# Patient Record
Sex: FEMALE | Race: WHITE
[De-identification: names, ages, dates, MRNs, and addresses within clinical notes are randomized per-mention and may not be internally consistent; named-entity substitution may affect disease eponyms.]

---

## 2017-11-03 ENCOUNTER — HOSPITAL ENCOUNTER (OUTPATIENT)
Dept: HOSPITAL 80 - BMCIMAGING | Age: 77
End: 2017-11-03
Attending: INTERNAL MEDICINE
Payer: COMMERCIAL

## 2017-11-03 DIAGNOSIS — Z12.31: Primary | ICD-10-CM

## 2017-11-03 PROCEDURE — G0202 SCR MAMMO BI INCL CAD: HCPCS

## 2018-07-11 ENCOUNTER — HOSPITAL ENCOUNTER (OUTPATIENT)
Dept: HOSPITAL 80 - BMCIMAGING | Age: 78
End: 2018-07-11
Attending: FAMILY MEDICINE
Payer: COMMERCIAL

## 2018-07-11 DIAGNOSIS — M79.89: Primary | ICD-10-CM

## 2018-12-03 ENCOUNTER — HOSPITAL ENCOUNTER (OUTPATIENT)
Dept: HOSPITAL 80 - BMCIMAGING | Age: 78
End: 2018-12-03
Attending: INTERNAL MEDICINE
Payer: COMMERCIAL

## 2018-12-03 DIAGNOSIS — Z12.31: Primary | ICD-10-CM

## 2018-12-03 DIAGNOSIS — Z80.3: ICD-10-CM

## 2018-12-29 ENCOUNTER — HOSPITAL ENCOUNTER (INPATIENT)
Dept: HOSPITAL 80 - FED | Age: 78
LOS: 4 days | Discharge: NURSING, REHAB OR LONG-TERM CARE | DRG: 535 | End: 2019-01-02
Attending: FAMILY MEDICINE | Admitting: FAMILY MEDICINE
Payer: COMMERCIAL
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
## 2018-12-29 DIAGNOSIS — M54.9: ICD-10-CM

## 2018-12-29 DIAGNOSIS — D63.8: ICD-10-CM

## 2018-12-29 DIAGNOSIS — J96.01: ICD-10-CM

## 2018-12-29 DIAGNOSIS — E55.9: ICD-10-CM

## 2018-12-29 DIAGNOSIS — I48.91: ICD-10-CM

## 2018-12-29 DIAGNOSIS — S32.501A: Primary | ICD-10-CM

## 2018-12-29 DIAGNOSIS — R74.0: ICD-10-CM

## 2018-12-29 DIAGNOSIS — I50.22: ICD-10-CM

## 2018-12-29 DIAGNOSIS — Z66: ICD-10-CM

## 2018-12-29 DIAGNOSIS — K76.0: ICD-10-CM
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
## 2018-12-29 LAB — PLATELET # BLD: 89 10^3/UL (ref 150–400)

## 2018-12-29 PROCEDURE — G0480 DRUG TEST DEF 1-7 CLASSES: HCPCS

## 2018-12-29 NOTE — EDPHY
H & P


Time Seen by Provider: 12/29/18 22:11


HPI/ROS: 





Chief Complaint:  Fall, right hip pain





HPI:  78-year-old woman who reports no medical problems had a mechanical fall 

this evening landing on her right hip.  Patient states she was going from her 

bedroom to the bathroom.  Patient does admit to drinking gin and tonics this 

evening.  EMS noted that she had an irregular rhythm.  She reports no history 

of cardiac arrhythmia is in the past.  No chest pain or shortness of breath.  

She did not hit her head.  No loss of consciousness.  She is awake and alert 

and able to tell me what happened.  No fevers or chills.  No cough.  Family was 

able to help to a feet get her the bathroom with assistance but is continuing 

to complain of right-sided hip pain.  She was able to weight bear.





ROS:  10 systems were reviewed and were negative except those elements noted in 

the HPI.





PMH:  Denies





Social History: No smoking, occasional alcohol





Family History: non-contributory





Physical Exam:


Gen: Awake, Alert, No Distress


HEENT:  


     Nose: no rhinorrhea


     Eyes: PERRLA, EOMI


     Mouth: Moist mucosa 


Neck: Supple, no JVD


Chest: nontender, lungs clear to auscultation


Heart: S1, S2 normal, no murmur, tachycardic, irregularly irregular


Abd: Soft, non-tender, no guarding


Back: no CVA tenderness, no midline tenderness 


Ext: no edema, non-tender, there is a large hematoma over her right lateral hip 

with overlying ecchymosis.  No bony tenderness.


Skin: no rash


Neuro: CN II-XII intact, Sensation grossly intact, Strength 5/5 in bilateral 

upper and lower extremities





Constitutional: 


 Initial Vital Signs











Temperature (C)  36.7 C   12/29/18 22:05


 


Heart Rate  104 H  12/29/18 22:05


 


Respiratory Rate  18   12/29/18 22:05


 


Blood Pressure  93/70 L  12/29/18 22:05


 


O2 Sat (%)  95   12/29/18 22:05








 











O2 Delivery Mode               Nasal Cannula


 


O2 (L/minute)                  2














Allergies/Adverse Reactions: 


 





erythromycin base [Erythromycin Base] Allergy (Intermediate, Verified 12/11/12 

11:12)


 SCALP ITCHING AND FELT LIKE LITTLE BUMPS WERE ALL OVER IT








Home Medications: 














 Medication  Instructions  Recorded


 


[place]    12/08/12


 


oxyCODONE/APAP 5/325 [Percocet 1 tab PO Q6 #10 tab 12/08/12 5/325]  














Medical Decision Making





- Diagnostics


Imaging Results: 


 Imaging Impressions





Hip X-Ray  12/29/18 22:13


Impression: Negative. No displaced fracture.


 


Comment: If patient is unable to bear weight, consider proceeding with 

noncontrast CT of the pelvis.











ED Course/Re-evaluation: 





78-year-old with right hip contusion status post fall.  She has a new onset 

AFib with RVR.  She has been drinking tonight.  Do not have time of onset.  No 

fracture inhibitor large contusion.  Plan will be to admit for further 

evaluation of her atrial fibrillation.  I have called the hospitalist to 

arrange for admission.  Will give 5 mg of metoprolol IV.  Will hold on 

anticoagulation at the hospitalist's request.





- Data Points


Laboratory Results: 


 Laboratory Results





 12/29/18 22:39 





 12/29/18 22:39 





 











  12/29/18 12/29/18 12/29/18





  22:43 22:39 22:39


 


WBC      5.30 10^3/uL 10^3/uL





     (3.80-9.50) 


 


RBC      2.64 10^6/uL L 10^6/uL





     (4.18-5.33) 


 


Hgb      11.3 g/dL L g/dL





     (12.6-16.3) 


 


Hct      32.7 % L %





     (38.0-47.0) 


 


MCV      123.9 fL H fL





     (81.5-99.8) 


 


MCH      42.8 pg H pg





     (27.9-34.1) 


 


MCHC      34.6 g/dL g/dL





     (32.4-36.7) 


 


RDW      18.0 % H %





     (11.5-15.2) 


 


Plt Count      89 10^3/uL L 10^3/uL





     (150-400) 


 


MPV      10.4 fL fL





     (8.7-11.7) 


 


Neut % (Auto)      74.2 % %





     (39.3-74.2) 


 


Lymph % (Auto)      13.4 % L %





     (15.0-45.0) 


 


Mono % (Auto)      7.9 % %





     (4.5-13.0) 


 


Eos % (Auto)      2.3 % %





     (0.6-7.6) 


 


Baso % (Auto)      1.3 % %





     (0.3-1.7) 


 


Nucleat RBC Rel Count      0.4 % H %





     (0.0-0.2) 


 


Absolute Neuts (auto)      3.93 10^3/uL 10^3/uL





     (1.70-6.50) 


 


Absolute Lymphs (auto)      0.71 10^3/uL L 10^3/uL





     (1.00-3.00) 


 


Absolute Monos (auto)      0.42 10^3/uL 10^3/uL





     (0.30-0.80) 


 


Absolute Eos (auto)      0.12 10^3/uL 10^3/uL





     (0.03-0.40) 


 


Absolute Basos (auto)      0.07 10^3/uL 10^3/uL





     (0.02-0.10) 


 


Absolute Nucleated RBC      0.02 10^3/uL H 10^3/uL





     (0-0.01) 


 


Immature Gran %      0.9 % %





     (0.0-1.1) 


 


Immature Gran #      0.05 10^3/uL 10^3/uL





     (0.00-0.10) 


 


Platelet Estimate      Pending 





    


 


Smear Review By      Pending 





    


 


Sodium    137 mEq/L mEq/L  





    (135-145)  


 


Potassium    3.6 mEq/L mEq/L  





    (3.5-5.2)  


 


Chloride    105 mEq/L mEq/L  





    ()  


 


Carbon Dioxide    20 mEq/l L mEq/l  





    (22-31)  


 


Anion Gap    12 mEq/L mEq/L  





    (6-14)  


 


BUN    14 mg/dL mg/dL  





    (7-23)  


 


Creatinine    1.1 mg/dL H mg/dL  





    (0.6-1.0)  


 


Estimated GFR    48   





    


 


Glucose    105 mg/dL H mg/dL  





    ()  


 


Calcium    7.8 mg/dL L mg/dL  





    (8.5-10.4)  


 


POC Troponin I  0.01 ng/mL ng/mL    





   (0.00-0.08)   


 


Ethyl Alcohol    291 mg/dL H mg/dL  





    (0-10)  











Point of Care Test Results: 


 Chemistry











  12/29/18





  22:43


 


POC Troponin I  0.01 ng/mL ng/mL





   (0.00-0.08) 














Departure





- Departure


Disposition: [facility]


Clinical Impression: 


 Atrial fibrillation, Hematoma of hip





Condition: Fair


Referrals: 


Patient,NotPresent [Unknown] - As per Instructions

## 2018-12-30 LAB
CK SERPL-CCNC: 77 IU/L (ref 0–156)
INR PPP: 1.27 (ref 0.83–1.16)
PLATELET # BLD: 86 10^3/UL (ref 150–400)
PROTHROMBIN TIME: 16.1 SEC (ref 12–15)

## 2018-12-30 RX ADMIN — OXYCODONE HYDROCHLORIDE PRN MG: 15 TABLET ORAL at 02:01

## 2018-12-30 RX ADMIN — OXYCODONE HYDROCHLORIDE PRN MG: 15 TABLET ORAL at 19:45

## 2018-12-30 RX ADMIN — METOPROLOL TARTRATE SCH MG: 25 TABLET, FILM COATED ORAL at 09:20

## 2018-12-30 RX ADMIN — SODIUM CHLORIDE SCH MLS: 900 INJECTION, SOLUTION INTRAVENOUS at 19:46

## 2018-12-30 RX ADMIN — DILTIAZEM HCL 125 MG/125ML IN DEXTROSE 5% IV SOLN (1 MG/ML) SCH MLS: 125-5/125 SOLUTION at 12:00

## 2018-12-30 RX ADMIN — METOPROLOL TARTRATE SCH MG: 25 TABLET, FILM COATED ORAL at 02:01

## 2018-12-30 NOTE — ASMTCMCOM
CM Note

 

CM Note                       

Notes:

Pt is a 77 yo F [place] Pt presents with a-fib 

and hip hematoma.  Pt has a history of falls and alcohol use. Pt and daughter are interested in 

getting a life alert. Pt needs  ETOH education prior to discharge. 





Plan: TBD 

 

Date Signed:  12/30/2018 03:12 PM

Electronically Signed By:MAGUI Morgan

## 2018-12-30 NOTE — GHP
DATE OF ADMISSION:  2018



Patient provides history, is a fair historian.  EMR was reviewed and case 
discussed with ED provider.



CHIEF COMPLAINT:  Right hip pain after a fall.



HISTORY OF PRESENT ILLNESS:  This is a very pleasant 78-year-old female with a 
past medical history significant for chronic back pain and vitamin D deficiency 
who presents to the emergency department today via EMS following a mechanical 
fall at home.  The patient was spending time with her daughter.  She reported 
that she had been drinking a martini when she had a mechanical fall.  Patient 
is not sure exactly how she fell.  She denies any headache, chest pain, 
palpitations, shortness of breath, lightheadedness or presyncope.  She denies 
any head injury.  No loss of consciousness.  The patient landed on her right 
hip and arm.  She has had increased pain following that fall.  EMS transported 
the patient to the emergency department, however, noted that she was in atrial 
fibrillation with RVR, heart rate in the 120s.  The patient without any 
hypoxia.  Blood pressure was low normal 104/66 en route.  The patient is not 
chronically on any blood thinners.  She does report that over the past week, 
she has been feeling fatigued and generally unwell.  She reports some GI upset, 
but no nausea, vomiting, no diarrhea.  She endorses some chills but no fevers.  
She reports she and her family just recently returned from [place].  Her 
daughter who is at bedside has had upper respiratory-type symptoms with nasal 
congestion and cough for the last several days.  The patient does not have any 
of these symptoms.



REVIEW OF SYSTEMS:  Ten systems reviewed, negative except as noted above.



ALLERGIES:  Erythromycin base.



HOME MEDICATIONS:  Vitamin D p.r.n. when patient can recall to take, Percocet 5/
325 1 tab p.o. q.6 hours p.r.n.



PAST MEDICAL HISTORY:  Significant for chronic back pain.



PAST SURGICAL HISTORY:  Patient had an appendectomy at age 12.  She denies any 
additional surgery.  She reports that she had right upper extremity fracture 
and left upper extremity fractures after a mechanical fall that required 
setting and casting but no surgical intervention.



FAMILY HISTORY:  Unknown.  Both parents are .



SOCIAL HISTORY:  The patient lives alone in her home.  Her daughter is 
currently at bedside, lives locally.  Good support available.  The patient 
reports that she typically goes to the gym 3 times weekly for swimming and 
treadmill with multiple friends.  However, her gym has been closed as of 
recently.  Patient reports multiple times that she feels off her schedule.  She 
feels that she perhaps has been drinking a little bit more at home since she 
has not been able to go to the gym, but does not believe that it is excessive.  
She denies any tobacco or illicit drugs or marijuana.



CODE STATUS:  The patient reports that she does not want any heroic measures 
including CPR and intubation.



PHYSICAL EXAMINATION:  VITAL SIGNS:  Upon arrival to the ED, blood pressure was 
93/70, heart rate 104 to 120s, respiratory rate 18, O2 saturation 94% on room 
air with temperature 36.7.  Vitals currently available:  Blood pressure 109/79, 
heart rate 100s to 1-teens, increases to 120s to 130s with movement, 
respiratory rate 17, O2 saturation 92% on 2 L by nasal cannula, temperature 
36.4.  GENERAL:  No acute distress.  Pleasant, frail, elderly female who is 
lying quietly in bed asleep.  She wakes easily to her name.  Her daughter is at 
bedside asleep.  HEAD:  Normocephalic, atraumatic.  EYES:  Extraocular muscles 
are grossly intact.  Pupils equal, round, slightly decreased reactivity to 
light bilaterally but symmetric.  No scleral icterus or conjunctival injection.
  ENT:  Mucous membranes appear dry.  No oropharyngeal erythema or exudates.  
NECK:  Supple.  Trachea midline.  CV:  Tachycardic and irregularly irregular.  
Limited exam for murmurs or rubs due to tachycardia.  No chest wall tenderness 
to palpation.  RESPIRATORY:  Lungs are clear to auscultation bilaterally.  No 
wheezes, rales, or rhonchi appreciated.  No cough.  ABDOMEN:  Soft, obese.  
Positive bowel sounds.  No tenderness to palpation.  No rebound, guarding, or 
masses appreciated.  :  No suprapubic tenderness to palpation.  No Sal 
catheter in place.  EXTREMITIES:  Patient with decreased range of motion in the 
right hip.  She does complain of pain with flexion or movement.  She has a 
fairly large hematoma and swelling over the right lateral hip.  It is tender to 
palpation.  She is able to move all her upper extremities with strength grossly 
intact.  NEURO:  Grossly nonfocal.  No facial drooping.  Moves all extremities 
with limitations as noted above.  PSYCH:  Patient is slightly anxious, but she 
is pleasant and cooperative.  She is occasionally slightly tangential, but 
redirectable.  No tremors present.



LABORATORY STUDIES:  WBCs 5.30, H and H are 11.3 and 32.7, MCV of 123.9, 
platelet count is 89,000, no bands.  PT 16.1, INR is 1.27, PTT is 31.3.  



Sodium is 137, potassium 3.6, chloride 105, CO2 is 20, anion gap 12, BUN is 14, 
creatinine is 1.1.  GFR is 48, glucose 105, calcium 7.8, total bilirubin is 1.5
, conjugated 0.7, ALT 66, AST is 133, alkaline phosphatase is 119.  Troponin is 
negative.  Total protein 5.8, albumin 3.1, TSH 3.160.  U-tox; alcohol level is 
291 mg/dL.



STUDIES:  Hip x-ray image and report reviewed myself, negative for displaced 
fracture.  Demineralized bones.  Severe right and moderate left hip 
osteoarthritis with joint space narrowing and osteophyte. 



EKG reviewed myself showing atrial fibrillation in the 110s.  The PVCs are 
present.  Left axis deviation is present.  Q-waves in the inferior leads.  ST 
depressions in the lateral leads.  QTc 465.  No acute ST elevations.



ASSESSMENT/PLAN:  78-year-old female with history of chronic back pain who 
presents following a mechanical fall in setting of alcohol consumption.

1.  Atrial fibrillation with rapid ventricular response:  The patient denies 
any symptoms of palpitations, presyncope, chest pain or shortness of breath.  
Her resting ventricular rate was in the low 100s in the ER and would rise with 
any kind of movement to the 120s to 130s.  She was given 5 mg dose of 
metoprolol with some improvement in her rate control.  Since arrival to the 
medical floor, patient's heart rate continues to rise drastically up to the 
140s with movement or ambulation to the bathroom.  She did endorse a little bit 
of lightheadedness.  She was given 12.5 mg dose of p.o. metoprolol and heart 
rate is in the low 100s.  Echocardiogram has been ordered for the morning.  
Patient is not a good candidate for anticoagulation at this time in setting of 
active right hip hematoma, anemia and coagulopathy, with a platelet count of 89,
000.  Additional workup as noted below.

2.  Right hip hematoma:  Will continue to monitor.  Holding anticoagulation, 
fall precautions, bed alarm.

3.  Coagulopathy:  Suspect patient has some underlying liver involvement 
related to an under-reported consumption of alcohol.  The patient's alcohol 
level just under 300 mg/dL.  I suspect that she is under-reporting as she did 
advise her intake nurse that she has 1 drink on a daily basis.  She denied that 
she drank daily, but more recently notes that her drinking habits have changed 
slightly as she has not been able to go to the gym to coal renovations.  She 
also has transaminitis and hyperbilirubinemia along with elevated PT/INR.  
Discussed with the patient obtaining a limited abdominal ultrasound to further 
evaluate for these abnormal labs and patient declines for today, but amenable 
to repeating labs and reconsidering if these remain abnormal.  The patient 
without any abdominal pain on exam.  No Leonard sign.  She does not have any 
apparent jaundice or scleral icterus.  Additional counseling for alcohol 
cessation should be encouraged during this hospital stay.  However, at this time
, patient denies that she drinks any significant quantity of alcohol.  The 
patient's historical lab studies were reviewed.  Appears she had an abnormal 
SPEP study.  These labs are remote from 2015.  The patient denies any chronic 
or diagnosed medical issues beyond her chronic back pain.  Additional 
investigation into patient's previous workup needed. 

4.  Anemia:  Appears the patient has a history of anemia of chronic disease 
based on labs from .  Previous hemoglobin and hematocrit available from 
that time were not as decreased as they are today.  Unclear how much of the 
anemia is related to patient's hematomas.  Will monitor her hemoglobin and 
hematocrit closely.  She also reports that she has been having intermittent 
episodes of epistaxis over the last 24 hours.

5.  Ethyl alcohol use:  Patient reported to me that she does not drink daily 
but may have been increasing over the past month since her gym has been under 
innovations.  She did note to the RN she does drink on a daily basis, usually a 
martini.  Will monitor for any evidence of withdrawal.  At this time, her 
alcohol level is elevated.

6.  Chronic pain:  Patient currently comfortable as long as she is not 
ambulating.  We will plan to have PT/OT evaluate the patient tomorrow.  

7.  Fluid, electrolyte, nutrition:  Patient will receive IV fluids over the 
course of the evening, regular diet as tolerated.  Electrolytes will be 
monitored.  Replace if needed.

8.  Prophylaxis:  Sequential compression devices if tolerated.  Holding 
anticoagulation in setting of coagulopathy and thrombocytopenia and large acute 
right hip hematoma.



CODE STATUS:  DNR/DNI.  Patient does not want any heroic measures.



DISPOSITION:  Patient currently admitted to observation status.  Should she 
convert and pending additional studies, however, if patient should have 
persistent studies worsening laboratory studies or continued difficulty with 
ambulation, may need to consider transition to inpatient care.  Currently 
patient is being monitored closely on PCU for close cardiac monitoring.





Job #:  [id number]

MTDD

## 2018-12-30 NOTE — PDMN
Medical Necessity


[place] [facility] [place] 77 yo presents post mechanical fall found to 

be in afib w/ RVR.  Initially obs for workup but HR remains 100-140s and pt w/ 

new onset hypoxic resp fx overnight requiring O2 via mask to keep sats >90%.   

Cardiology consulted, TTE pending.  Meets MCG IP criteria for afib w/ 

hemodynamic instability w/ persistent RVR and new hypoxemia.  Change to IP 

status 12/30/18@1426 per MD order.

## 2018-12-30 NOTE — ECHO
[email or web address]





[address] [address] 

Main: [phone] 



Fax: 



Transthoracic Echocardiogram 

Name:             MAHAD BARR                     MR#:

[id number]

Study Date:       2018                           Study Time:

10:34 AM

YOB: 1940                           Age:

78 year(s)

Height:           167.6 cm (66 in.)                    Weight:

77.11 kg (170 lb.)

BSA:              1.87 m2                              Gender:

Female

Examination:      Echo                                 Indication:

New onset of Atrial Fibrillation

Image Quality:                                         Contrast: 

Requested by:     Crystal Carrasco                         BP:

106 mmHg/77 mmHg

Heart Rate:                                            Rhythm:

Atrial fibrillation

Indication:       New onset of Atrial Fibrillation 



Procedure Staff 

Ultrasound Technician:   Gio Wells RDCS 

Reading Physician:       Mercy Dee MD 

Requesting Provider: 



Conclusions:          Normal size left ventricle.  

Mildly reduced systolic LV function.  

The ejection fraction is estimated to be 40-45 %.  

mild global hypokinesis with more significant hypokinesis of the

septum.

Normal size right ventricle.  

Mildly reduced RV function.  

The left atrium is severely dilated.  

Trivial to mild mitral regurgitation.  

No aortic valve stenosis is present.  

Mild tricuspid regurgitation is present.  

The pulmonary artery pressure is mildly increased.  

Estimated PASP is 41mmHg  

No pericardial effusion.  

There is no previous echocardiogram for comparison. 



Measurements: 

Chambers                    Valvular Assessment AV/MV

Valvular Assessment TV/PV



Normal                                   Normal

Normal

Name         Value     Range              Name         Value Range

Name           Value Range

Ao Lili (MM): 3.1 cm    (2.2 cm-3.7            AV Vmax:     0.91 m/s (1

m/s-1.7       TR Vmax:       3.00 mm/s ( - )



cm)                                  m/s)             TR PGmax:

36 mmHg ( - )

IVSd (2D):   0.9 cm (0.6 cm-1.1               AV maxPG:    3 mmHg ( -

)          syst. PAP: 41 mmHg  ( - )



cm)                LVOT Vmax:   0.45 m/s (0.7 m/s-1.1     PV Vmax:

0.72 m/s (0.6 m/s-0.9

LVDd (2D):   4.4 cm    (3.9 cm-5.3                              m/s)

m/s)



cm)                MV E Vmax:   0.74 m/s ( - )        PV PGmax:      2

mmHg ( - )

LVDs (2D):   3.6 cm    (2.1 cm-4 



cm)   

LVPWd (2D):  1.0 cm    ( - )   

EF Range:    40-45 % 



Patient: MAHAD BARR                    MRN: [id number]

Study Date: 2018   Page 1 of 2

10:34 AM 









RVDd(2D): 3.8 cm (1.9 cm-3.8 

cmmm)  



Continued Measurements: 

Chambers                      Valvular Assessment AV/MV

Valvular Assessment TV/PV



Name                       Value          Name

Value    Name                      Value

LADs:                    4.0 cm               MV E' Septal:

0.06  m/s   CVP (est.):             5 mmHg

LADs Lon.9 cm               MV E/E' Septal:

12.90

LA Area:                 33.5 cm2   

LA Volume:               115 ml   

LA Volume Index:         61.5 ml/m2   



Findings:             Left Ventricle: 

Normal size left ventricle. Mildly reduced systolic LV function. The

ejection fraction is estimated to be

40-45 %. mild global hypokinesis with more significant hypokinesis of

the septum.

Right Ventricle: 

Normal size right ventricle. Mildly reduced RV function.  

Left Atrium: 

The left atrium is severely dilated.  

Right Atrium: 

The right atrium is severely dilated.  

Mitral Valve: 

Mild mitral valve leaflet calcification is present. Trivial to mild

mitral regurgitation.

Aortic Valve: 

The aortic valve is tri-leaflet. Mild aortic cusp calcification is

noted. There is no aortic valve

regurgitation. No aortic valve stenosis is present.  

Tricuspid Valve: 

Mild tricuspid regurgitation is present. The pulmonary artery pressure

is mildly increased. Estimated

PASP is 41mmHg  

Pulmonic Valve: 

The pulmonic valve is normal in appearance and function.  

Aorta: 

The aorta is normal.  

Pericardium: 

No pericardial effusion. 







Electronically signed by Mercy Dee MD on 2018 at 11:41 AM 

(No Signature Object) 



Patient: MAHAD BARR                    MRN: [id number]

Study Date: 2018   Page 2 of 2

10:34 AM 







[id number]

## 2018-12-30 NOTE — CPEKG
Test Reason : OPEN

Blood Pressure : ***/*** mmHG

Vent. Rate : 110 BPM     Atrial Rate : 000 BPM

   P-R Int : 156 ms          QRS Dur : 100 ms

    QT Int : 343 ms       P-R-T Axes : 000 -58 048 degrees

   QTc Int : 465 ms

 

Atrial fibrillation

Ventricular premature complex

LAD, consider LAFB or inferior infarct

Low voltage, extremity leads

 

Confirmed by Tate Muir ([id number]) on 12/30/2018 3:01:23 AM

 

Referred By:             Confirmed By:Tate Muir

## 2018-12-30 NOTE — HOSPPROG
Hospitalist Progress Note


Assessment/Plan: 


A Fib w RVR


- Admitted with -140's


- S/p 5 mg IV Metoprolol in ED, started on Metoprolol 12.5 mg BID overnight


- HR remains elevated overnight especially with activity


- Cardiology consulted this AM for further evaluation, recommended starting 

Diltiazem gtt for rate control, likely transition to PO diltiazem 


- TTE ordered


- CHADsVASC >2 given patient's age however given recent fall with hematoma 

would hold off on AC, will start ASA 81 mg for now 


- Plan per cardiology to reevaluate chronic AC as an outpatient 





Fall with R Hip Hematoma


- R Hip XR negative for fracture on admission


- If patient unable to bear weight on RLE, will proceed with RLE CT to further 

evaluate


- PT/OT ordered


- Holding AC 


- PRN Pain medications 





Acute Hypoxic Respiratory Failure


- Requiring 6L of 02 to maintain 02 sat >88%


- No hx of 02 use, lung disease


- Will order CXR to further evaluate, will consider CT if no findings 


- Continue to wean 02 as tolerated





Coagulopathy


- Suspect underlying liver involvement related to under-reported consumption of 

alcohol


- ETOH level 291 on admission


- Also with hyperbilirubinemia and elevated INR


- Will order RUQ U/S to further evaluate 





Anemia


- Hgb 11.3 on admission


- Appears hx of ACD from 2015


- Will continue to monitor 





FEN: Regular, IVF PRN





Code: DNR





DVT PPx: Holding in setting of bleed





Dispo: Pending clinical course 





Subjective: Patient reports pain in RLE this AM


Objective: 


 Vital Signs











Temp Pulse Resp BP Pulse Ox


 


 36.8 C   109 H  19   106/77   92 


 


 12/30/18 07:52  12/30/18 12:00  12/30/18 07:52  12/30/18 09:20  12/30/18 07:52








 Laboratory Results





 12/30/18 03:26 





 12/30/18 03:26 





 











 12/29/18 12/30/18 12/31/18





 05:59 05:59 05:59


 


Intake Total  1056 


 


Balance  1056 








 











PT  16.1 SEC (12.0-15.0)  H  12/29/18  22:39    


 


INR  1.27  (0.83-1.16)  H  12/29/18  22:39    














- Physical Exam


Constitutional: no apparent distress


Eyes: PERRL


Ears, Nose, Mouth, Throat: moist mucous membranes


Cardiovascular: irregularly irregular, tachycardia, No edema


Respiratory: no respiratory distress


Gastrointestinal: soft, non-tender abdomen


Skin: warm


Musculoskeletal: pain with ROM


Neurologic: AAOx3


Psychiatric: interacting appropriately





ICD10 Worksheet


Patient Problems: 


 Problems











Problem Status Onset


 


Atrial fibrillation Acute  


 


Hematoma of hip Acute

## 2018-12-30 NOTE — GCON
DATE OF CONSULTATION:  12/30/2018



REFERRING PHYSICIAN:  Dr. Rosa



CHIEF COMPLAINT:  We have been asked by Dr. Rosa to evaluate the patient with atrial fibrillation.



HISTORY OF PRESENT ILLNESS:  The patient is an 78-year-old female with a past medical history for chr
onic back pain who presented to the emergency department on 12/29/2018, with a mechanical fall at [facility]
e.  The patient was in her usual state of health until the day of admission when she was celebrating 
the holidays with a martini.  Later that day, she went to the bathroom, and upon returning to her bed
, she fell and landed on her right hip.  EMS was called and patient was brought to the emergency depa
rtment for further evaluation. 



In the emergency department, patient was noted to be in atrial fibrillation with a heart rate of 120 
beats per minute.  Patient denies symptoms of palpitations, syncope, or presyncope with the fall.  Sachin zheng's blood alcohol content was 291 on admission.  Patient denies a previous history of atrial fibr
illation.  There is no history of palpitations, syncope, or presyncope.  Patient's last visit her [facility] physician was approximately 2 years ago and she had a normal rhythm at that time per report
.  There is no history of hypertension, diabetes, stroke, or vascular disease.



PAST MEDICAL HISTORY:  

1.  Vitamin D deficiency.

2.  Chronic back pain.



PAST SURGICAL HISTORY:  

1.  Status post appendectomy.

2.  Status post hand surgery for a fall approximately 10 years ago.



MEDICATIONS:  Please see medicine reconciliation form.



ALLERGIES:  Erythromycin.



SOCIAL HISTORY:  Patient lives independently.  She has a supportive family.  Patient does report cons
uming approximately 2 drinks per night.  She does not smoke.



FAMILY HISTORY:  Noncontributory.



REVIEW OF SYSTEMS:  10-point review of systems is negative, except for right hip pain.



PHYSICAL EXAMINATION:  GENERAL:  The patient is resting in bed.  She does report of right hip pain at
 this time.  VITAL SIGNS:  Temperature is afebrile.  Pulse is 109, blood pressure 106/77, respiratory
 rate is 19, SaO2 is 92% on O2 mask.  HEENT:  Normocephalic, atraumatic.  Extraocular muscles intact.
  NECK:  No JVD.  No bruits.  LUNGS:  Clear to auscultation bilaterally.  CARDIOVASCULAR:  Tachycardi
a, irregular rhythm, S1-S2.  Grade 2/6 systolic ejection murmur is noted at the left lateral sternal 
border.  ABDOMEN:  Soft, mildly tender to palpation.  Normoactive bowel sounds.  EXTREMITIES:  There 
is evidence of a hematoma in her right hip area.  SKIN:  No evidence of ecchymoses.  NEURO:  Patient 
is awake, alert, and oriented x3.



LABORATORY/IMAGING:  White blood cell count 6.51, hemoglobin is 10.3, hematocrit is 30.1, platelet co
unt is 86.  INR 1.27.  Sodium 139, potassium 3.6, chloride 106, CO2 21, BUN 14, creatinine 1.1.  AST 
is elevated at 133, ALT is elevated at 66.  Troponin within normal limits x1.  TSH is within normal l
imits.  Alcohol level is 291.  



EKG demonstrates atrial fibrillation with premature ventricular contraction, left anterior fascicular
 block.



ASSESSMENT/PLAN:  The patient is an 78-year-old female with atrial fibrillation.  Patient presents wi
th new diagnosis of atrial fibrillation.  The onset is not entirely clear as patient is asymptomatic 
with respect to her atrial fibrillation.  The onset is likely over the last 2 years based on her [facility] visits.  Her heart rate is suboptimally controlled at this time.  Her CHADS2-VASc score is 3 for 
age and female sex.  



We will plan on a rate control and anticoagulation strategy at this time as patient is not a good ant
icoagulation candidate given active hematoma, as well as low platelet count and some hepatic insuffic
iency.  We will start diltiazem for rate control and use aspirin for anticoagulation.  Long-term, pat
ient would likely benefit from Coumadin or a novel anticoagulation agent.  Will re-evaluate this deci
gigi process in approximately 2 to 3 weeks period of time once her hepatic impairment has been evalua
amber and she is recovered from her fall.  Could also consider using a Watchman device.





Job #:  [id number]

## 2018-12-31 LAB — PLATELET # BLD: 77 10^3/UL (ref 150–400)

## 2018-12-31 RX ADMIN — OXYCODONE HYDROCHLORIDE PRN MG: 15 TABLET ORAL at 14:40

## 2018-12-31 RX ADMIN — ACETAMINOPHEN PRN MG: 325 TABLET ORAL at 21:03

## 2018-12-31 RX ADMIN — SODIUM CHLORIDE SCH MLS: 900 INJECTION, SOLUTION INTRAVENOUS at 05:42

## 2018-12-31 RX ADMIN — DILTIAZEM HCL 125 MG/125ML IN DEXTROSE 5% IV SOLN (1 MG/ML) SCH MLS: 125-5/125 SOLUTION at 04:11

## 2018-12-31 RX ADMIN — DILTIAZEM HYDROCHLORIDE SCH MG: 120 CAPSULE, EXTENDED RELEASE ORAL at 11:51

## 2018-12-31 RX ADMIN — ASPIRIN SCH MG: 325 TABLET, FILM COATED ORAL at 08:04

## 2018-12-31 RX ADMIN — OXYCODONE HYDROCHLORIDE PRN MG: 15 TABLET ORAL at 21:02

## 2018-12-31 NOTE — PDCARPN
Cardiology Progress Note


Chief Complaint: 





Fall


Assessment/Plan: 


Assessment:





The patient is a 77 y/o F who was admitted with mechanical fall in the setting 

of ETOH use.  She was found to be in a.fib with RVR on admission.  She was 

started on a Dilt drip with a improvement in her rates.  A echo showed global 

hypokinesis which was more prominent within the septum with a EF of 40-45%.  

Her initial trop was negative.  CXR shows chronic CHF.  She was not started on 

anticoagulation secondary to a hematoma.  











Plan:





1. Atrial fibrillation- plan to rate control with dilt.  Will transition to PO 

Dilt today.  She has a CHADS VASc of 3 but has a active hematoma.  Will hold 

full dose anticoagulation for but continue Aspirin.





2. new CMP with EF of 40-45%.  Tachycardic induced (most likely) versus ETOH 

induced (least likely) versus ischemic.  She is CP free and her initial trop 

was negative.  She will need a ischemic work-up at some point but this can be 

done as a outpatient which she prefers.





3. Systolic CHF- Will give one dose of Lasix 20mg IV now.  Soft BP yesterday.





4. Mechanical fall with leg hematoma- having significant discomfort.  Plan for 

CT today.





5. Anemia-secondary to #3.





6. hepatic impairment- work-up pending. 











12/31/18 11:41





Subjective: 





She denies any CP, SOB, or edema.  She is complaining of significant hip pain. 


Reviewed/Discussed With: hospitalist


Objective: 





 Vital Signs (8 Hrs)











  Temp Pulse Resp BP Pulse Ox


 


 12/31/18 08:00  36.5 C  8 L  17  111/71  95


 


 12/31/18 05:39  36.6 C  89  20  118/77  94


 


 12/31/18 04:11   91   








 Intake/Output (24 Hrs)











 12/30/18 12/31/18 01/01/19





 05:59 05:59 05:59


 


Intake Total  1465 760.5


 


Output Total   100


 


Balance  1465 660.5


 


Intake:   


 


  Oral (ml)  300 240


 


  IV Infused (ml)  1165 520.5


 


    Diltiazem HCl/D5w 125 ml  65 47.5





    @ Titrate IV CONT SHAUN Rx#   





    :[id number]   


 


    Ns 1,000 ml @ 100 mls/hr  1100 473





    IV CONT SHAUN Rx#:   





    [id number]   


 


Output:   


 


  Urine (ml)   100


 


    Bedside Commode   100


 


Other:   


 


  Weight  85.5 kg 


 


  Number of Voids   


 


    Bedside Commode  1 


 


    Incontinence  1 


 


  Number of Stools   


 


    Bedside Commode   1











Result Diagrams: 


 12/31/18 03:46





 12/31/18 03:46


Telemetry: 





a. fib rate controlled rate 70-90 BPM.





- Physical Exam


Constitutional: no apparent distress


Cardiovascular: irregularly irregular


Respiratory: other (decreased breath sounds at the left lung base)


Skin: no edema


Neurologic: AAOx3





ICD10 Worksheet


Patient Problems: 


 Problems











Problem Status Onset


 


Atrial fibrillation Acute  


 


Hematoma of hip Acute

## 2018-12-31 NOTE — HOSPPROG
Hospitalist Progress Note


Assessment/Plan: 


A Fib w RVR


- Admitted with -140's


- S/p 5 mg IV Metoprolol in ED, started on Metoprolol 12.5 mg BID 


- HR remains elevated overnight especially with activity


- Cardiology consulted on 12/30, started Diltiazem gtt with rate controlled, 

transitioned to PO diltiazem 240 mg qd today


- CHADsVASC >2 given patient's age however given recent fall with hematoma 

would hold off on AC, will continue  ASA 81 mg for now 


- Plan per cardiology to reevaluate chronic AC as an outpatient 





Fall with R Hip Hematoma


- R Hip XR negative for fracture on admission


- Patient still with difficulty bearing weight, proceed with RLE CT to further 

evaluate


- PT/OT ordered


- PRN Pain medications 





Acute Hypoxic Respiratory Failure


- Requiring 5L of 02 to maintain 02 sat >88%


- No hx of 02 use, lung disease


- CXR performed yesterday, possible focal infiltrate however no cough, fever, 

leukocytosis, will hold off on abx, also shows blunting of costoprhenic angles, 

likely component of fluid overload


- TTE performed on 12/30 shows EF 40-45% with mild global hypokinesis, septal 

hypokinesis


- Cardiology recommends 20 mg IV Lasix today given low BP yesterday, ischemic w/

u, but given negative trop and CP free, will defer to outpatient 


- Continue to wean 02 as tolerated





Hepatic Steatosis 


- Suspect underlying liver involvement related to under-reported consumption of 

alcohol


- ETOH level 291 on admission


- Also with hyperbilirubinemia and elevated INR


- RUQ U/S performed yesterday shows diffuse hepatic steatosis


- Continue to monitor LFTs





Anemia


- Hgb 11.3 on admission, 9.9 this AM


- Appears hx of ACD from 2015


- Will continue to monitor, transfused H/H <7/20





FEN: Regular, IVF PRN





Code: DNR





DVT PPx: Holding in setting of hematoma 





Dispo: Pending clinical course 





Subjective: Patient reports difficulty bearing weight on RLE this AM


Objective: 


 Vital Signs











Temp Pulse Resp BP Pulse Ox


 


 36.5 C   95   12   105/60   92 


 


 12/31/18 11:41  12/31/18 11:51  12/31/18 11:41  12/31/18 11:51  12/31/18 11:41








 Laboratory Results





 12/31/18 03:46 





 12/31/18 03:46 





 











 12/30/18 12/31/18 01/01/19





 05:59 05:59 05:59


 


Intake Total  1465 760.5


 


Output Total   100


 


Balance  1465 660.5








 











PT  16.1 SEC (12.0-15.0)  H  12/29/18  22:39    


 


INR  1.27  (0.83-1.16)  H  12/29/18  22:39    














- Physical Exam


Constitutional: no apparent distress


Eyes: PERRL


Ears, Nose, Mouth, Throat: moist mucous membranes


Cardiovascular: irregularly irregular


Respiratory: no respiratory distress, inspiratory crackles


Gastrointestinal: soft, non-tender abdomen


Skin: normal color


Musculoskeletal: pain with ROM


Neurologic: AAOx3


Psychiatric: interacting appropriately





ICD10 Worksheet


Patient Problems: 


 Problems











Problem Status Onset


 


Atrial fibrillation Acute  


 


Hematoma of hip Acute

## 2019-01-01 LAB — PLATELET # BLD: 74 10^3/UL (ref 150–400)

## 2019-01-01 RX ADMIN — OXYCODONE HYDROCHLORIDE PRN MG: 15 TABLET ORAL at 18:27

## 2019-01-01 RX ADMIN — OXYCODONE HYDROCHLORIDE PRN MG: 15 TABLET ORAL at 11:20

## 2019-01-01 RX ADMIN — DILTIAZEM HYDROCHLORIDE SCH MG: 120 CAPSULE, EXTENDED RELEASE ORAL at 08:28

## 2019-01-01 RX ADMIN — ACETAMINOPHEN PRN MG: 325 TABLET ORAL at 20:26

## 2019-01-01 RX ADMIN — ASPIRIN SCH MG: 325 TABLET, FILM COATED ORAL at 08:28

## 2019-01-01 RX ADMIN — OXYCODONE HYDROCHLORIDE PRN MG: 15 TABLET ORAL at 04:43

## 2019-01-01 RX ADMIN — DOCUSATE SODIUM AND SENNOSIDES SCH TAB: 50; 8.6 TABLET ORAL at 20:26

## 2019-01-01 NOTE — ASMTCMCOM
CM Note

 

CM Note                       

Notes:

Met with patient and daughter Darlyn to discuss rehab. Patient is amenable although anxious. I have 

sent referrals to [facility] and [facility], and Darlyn will visit today and let us know which one 

they choose. I also answered questions about what patient can expect upon discharge from SNF (ie 

homecare, private duty, etc....). Case Management will follow.



Current CM Discharge plan: SNF, location TBD

 

Date Signed:  01/01/2019 01:46 PM

Electronically Signed By:Leslie Saul RN

## 2019-01-01 NOTE — PDCARPN
Cardiology Progress Note


Chief Complaint: 





No complaints today.  Feeling well.  Mild right hip pains.





Assessment/Plan: 


Assessment:


1-1-19


Patient doing well today.  No cardiovascular complaints.  Patient was voicing 

her desire to find placement after this hospitalization.  Heart rates are 

slightly more elevated today (100-110 bpm) in comparison to days prior.  

Ongoing use of IS at bedside.  Intravenous therapy with CCB was discontinued 

yesterday in favor of oral therapy (240 mg of cardizem).  With this change, 

there has been a mild elevation in heart rates noted.  Anemia continues to be 

noted (continued drift downward).  Renal function is stable (Creatinine at 1.1)

.  Liver enzymes continue to be elevated (query alcohol use).  No evidence of 

withdrawal is noted this morning (uncertain on the chronicity or frequency of 

alcohol consumption).  Blood pressure was well controlled today and through the 

night.





12-31-18


The patient is a 79 y/o F who was admitted with mechanical fall in the setting 

of ETOH use.  She was found to be in a.fib with RVR on admission.  She was 

started on a Dilt drip with a improvement in her rates.  A echo showed global 

hypokinesis which was more prominent within the septum with a EF of 40-45%.  

Her initial trop was negative.  CXR shows chronic CHF.  She was not started on 

anticoagulation secondary to a hematoma.  











Plan:


(1)  Given the ongoing elevation to heart rates that has been noted, would add 

low dose beta blocker - especially with the mild reduction in systolic function 

that has been noted 


 - 25 mg of metoprolol tartrate


(2)  Would recommend the patient have outpatient stress testing given the 

reduction in LVEF noted by echocardiography


(3)  Uncertain etiology to the anemia that is noted.  Query right hip haematoma 

as part of the cause


(4)  No CHF signs or symptoms noted today - would refrain from diuretic use in 

order to keep blood pressure normotensive (and potentially allow for the low 

dose of beta blocker)


(5)  Ongoing work with social work for placement/rehab


(6)  [id number] score is 3/4 (3 for age and sex, and 4 if "CHF" signs/symptoms 

noted with reduction in LVEF), and anticoagulation is needed for CVA 

prophylaxis given ongoing atrial fibrillation, but also for DVT prophylaxis 

given her immobility secondary to fall.  Ongoing anemia is a concern with start 

to anticoagulation, and would recommend if this therapy is to be started, would 

do so in house to more closely monitor H/H.  

















Subjective: 





No active cardiovascular complaints this morning.


Objective: 





 Vital Signs (8 Hrs)











  Temp Pulse Resp BP Pulse Ox


 


 01/01/19 08:28   109 H   113/83 H 


 


 01/01/19 07:16  36.8 C  108 H  16  113/83 H  92


 


 01/01/19 04:00  36.6 C  100  15  120/80  94








 Intake/Output (24 Hrs)











 12/31/18 01/01/19 01/02/19





 05:59 05:59 05:59


 


Intake Total 1465 960.5 


 


Output Total  1600 


 


Balance 1465 -639.5 


 


Intake:   


 


  Oral (ml) 300 440 


 


  IV Infused (ml) 1165 520.5 


 


    Diltiazem HCl/D5w 125 ml 65 47.5 





    @ Titrate IV CONT SHAUN Rx#   





    :[id number]   


 


    Ns 1,000 ml @ 100 mls/hr 1100 473 





    IV CONT SHAUN Rx#:   





    [id number]   


 


Output:   


 


  Urine (ml)  1600 


 


    Bedside Commode  1000 


 


    Catheter  600 


 


Other:   


 


  Weight 85.5 kg 85.5 kg 


 


  Number of Voids   


 


    Bedside Commode 1  


 


    Incontinence 1  


 


  Number of Stools   


 


    Bedside Commode  1 











Result Diagrams: 


 01/01/19 03:38





 01/01/19 03:38


Telemetry: 





atrial fibrillation with rapid ventricular response (heart rates were >110 bpm)








- Physical Exam


Constitutional: WDWN, healthy appearing, no apparent distress


Eyes: PERRL, EOMI


Ears, Nose, Mouth, Throat: moist mucous membranes


Cardiovascular: irregularly irregular, pulses symmetric bilat, No jugular vein 

distention


Peripheral Pulses: 2+: dorsalis-pedis (R), dorsalis-pedis (L)


Respiratory: clear to auscultate bilat, no crackles, no wheezes, reduced air 

movement (in the bases)


Gastrointestinal: normoactive bowel sounds


Skin: no rashes, no edema


Musculoskeletal: no muscular tenderness (but right hip pains are noted)


Neurologic: AAOx3, CN II-XII grossly intact


Psychiatric: cooperative, interactive, following commands





ICD10 Worksheet


Patient Problems: 


 Problems











Problem Status Onset


 


Atrial fibrillation Acute  


 


Hematoma of hip Acute

## 2019-01-01 NOTE — GCON
ORTHOPEDIC ER CONSULT



CHIEF COMPLAINT:  Right groin pain.



DIAGNOSES:  

1.  Right inferior superior rami fracture.

2.  Right hip arthritis.



HISTORY OF PRESENT ILLNESS:  The patient is an 78-year-old female. She was admitted on 12/29/2018, wh
o had a mechanical fall on her right hip. She was triaged to the emergency room. X-rays were obtained
, which were negative, except for hip arthritis, and then a CT scan was obtained of the pelvis during
 hospital admission. I was asked to consult for pelvic fracture. 



Please see details of ER H and P and admitting H and P.



PHYSICAL EXAMINATION:  PERTINENT ORTHOPEDIC: Reveals a well-appearing female. She has a negative log 
roll. She is able to actively flex her hip to 90 degrees with some groin pain. She does have difficul
ty bearing weight, but we do not test her weightbearing. She has equal leg lengths. 

ABDOMEN: Soft.



DATA:  X-rays were reviewed, showed right hip arthritis. CT scan was reviewed, showed a superior infe
rior rami fracture, minimally displaced. 



No evidence of femoral neck hip fracture or intertrochanteric fracture.



IMPRESSION/RECOMMENDATIONS:  Stable pelvis injury. Recommend gentle range of motion. Weightbearing as
 tolerated with a walker. I anticipate that her pain will subside in the next few weeks. Follow up wi
th her regular primary care physician for x-rays.





Job #:  [id number]

## 2019-01-01 NOTE — SOAPPROG
SOAP Progress Note


Assessment/Plan: 


Assessment:


nonsurgical pelvis fx























Plan:





01/01/19 10:35


WBAT


Walker


fu with xrays in 1-2 weeks


PCP or Ortho


will sign off


call for ?


Objective: 





 Vital Signs











Temp Pulse Resp BP Pulse Ox


 


 36.8 C   109 H  16   113/83 H  92 


 


 01/01/19 07:16  01/01/19 08:28  01/01/19 07:16  01/01/19 08:28  01/01/19 07:16








 Laboratory Results





 01/01/19 03:38 





 01/01/19 03:38 





 











 12/31/18 01/01/19 01/02/19





 05:59 05:59 05:59


 


Intake Total 1465 960.5 


 


Output Total  1600 


 


Balance 1465 -639.5 








 











PT  16.1 SEC (12.0-15.0)  H  12/29/18  22:39    


 


INR  1.27  (0.83-1.16)  H  12/29/18  22:39    








see H and P





ICD10 Worksheet


Patient Problems: 


 Problems











Problem Status Onset


 


Atrial fibrillation Acute  


 


Hematoma of hip Acute

## 2019-01-01 NOTE — HOSPPROG
Hospitalist Progress Note


Assessment/Plan: 


A Fib w RVR


- Admitted with -140's


- S/p 5 mg IV Metoprolol in ED, started on Metoprolol 12.5 mg BID 


- HR remains elevated overnight especially with activity


- Cardiology consulted on 12/30, started Diltiazem gtt with rate controlled, 

transitioned to PO diltiazem 240 mg qd yesterday


- CHADsVASC >2 given patient's age however given recent fall with hematoma 

would hold off on AC, will continue  ASA 81 mg for now 


- Plan per cardiology to reevaluate chronic AC as an outpatient 





R Hip Fx


- R Hip XR negative for fracture on admission


- RLE CT performed on 12/31 shows nondisplaced fractures


- Ortho consulted recommended nonoperative management, WBAT


- PT/OT 


- PRN Pain medications 





Acute Hypoxic Respiratory Failure


- Requiring 5L of 02 to maintain 02 sat >88%, improved to 2L this AM


- No hx of 02 use, lung disease


- CXR performed, possible focal infiltrate however no cough, fever, leukocytosis

, will hold off on abx, also shows blunting of costoprhenic angles, likely 

component of fluid overload


- TTE performed on 12/30 shows EF 40-45% with mild global hypokinesis, septal 

hypokinesis


- Cardiology recommending holding lasix, ischemic w/u, but given negative trop 

and CP free, will defer to outpatient 


- Continue to wean 02 as tolerated





Hepatic Steatosis 


- Suspect underlying liver involvement related to under-reported consumption of 

alcohol


- ETOH level 291 on admission


- Also with hyperbilirubinemia and elevated INR


- RUQ U/S performed yesterday shows diffuse hepatic steatosis


- Continue to monitor LFTs





Anemia


- Hgb 11.3 on admission, 9.5 this AM


- Appears hx of ACD from 2015


- Will continue to monitor, transfused H/H <7/20





FEN: Regular, IVF PRN





Code: DNR





DVT PPx: Holding in setting of hematoma 





Dispo: Pending clinical course 





Objective: 


 Vital Signs











Temp Pulse Resp BP Pulse Ox


 


 36.6 C   91   15   110/72   91 L


 


 01/01/19 11:36  01/01/19 11:36  01/01/19 11:36  01/01/19 11:36  01/01/19 11:36








 Laboratory Results





 01/01/19 03:38 





 01/01/19 03:38 





 











 12/31/18 01/01/19 01/02/19





 05:59 05:59 05:59


 


Intake Total 1465 960.5 


 


Output Total  1600 50


 


Balance 1465 -639.5 -50








 











PT  16.1 SEC (12.0-15.0)  H  12/29/18  22:39    


 


INR  1.27  (0.83-1.16)  H  12/29/18  22:39    














- Physical Exam


Constitutional: no apparent distress


Eyes: PERRL


Ears, Nose, Mouth, Throat: moist mucous membranes


Cardiovascular: irregularly irregular


Respiratory: reduced air movement


Skin: warm


Neurologic: AAOx3


Psychiatric: interacting appropriately





ICD10 Worksheet


Patient Problems: 


 Problems











Problem Status Onset


 


Atrial fibrillation Acute  


 


Hematoma of hip Acute

## 2019-01-02 VITALS — DIASTOLIC BLOOD PRESSURE: 77 MMHG | SYSTOLIC BLOOD PRESSURE: 111 MMHG

## 2019-01-02 LAB — PLATELET # BLD: 84 10^3/UL (ref 150–400)

## 2019-01-02 RX ADMIN — OXYCODONE HYDROCHLORIDE PRN MG: 15 TABLET ORAL at 16:23

## 2019-01-02 RX ADMIN — OXYCODONE HYDROCHLORIDE PRN MG: 15 TABLET ORAL at 00:38

## 2019-01-02 RX ADMIN — DOCUSATE SODIUM AND SENNOSIDES SCH TAB: 50; 8.6 TABLET ORAL at 09:12

## 2019-01-02 RX ADMIN — ASPIRIN SCH MG: 325 TABLET, FILM COATED ORAL at 09:12

## 2019-01-02 RX ADMIN — OXYCODONE HYDROCHLORIDE PRN MG: 15 TABLET ORAL at 09:13

## 2019-01-02 RX ADMIN — DILTIAZEM HYDROCHLORIDE SCH MG: 120 CAPSULE, EXTENDED RELEASE ORAL at 09:11

## 2019-01-02 NOTE — ASMTLACE
LACE

 

Length of stay for            Answers:  4-6 days                              

current admission                                                             

Acuity / Level of             Answers:  Yes                                   

Care: Did the patient                                                         

have an inpatient                                                             

admission?                                                                    

Comorbidities - select        Answers:  Opioid dependence                     

all that apply                          / Chronic pain                        

# of Emergency department     Answers:  1-2                                   

visits in the last 6                                                          

months                                                                        

Score: 12

 

Date Signed:  01/02/2019 01:48 PM

Electronically Signed By:MAGUI Malone

## 2019-01-02 NOTE — PDIAF
- Diagnosis


Diagnosis: R Hip Fracture, CHF


Code Status: Do Not Resuscitate





- Medication Management


Discharge Medications: electronically signed and located in the Home Medication 

List.





- Orders


Services needed: Physical Therapy, Occupational Therapy


Diet Recommendation: cardiac -low fat low salt





- Follow Up Care


Current Providers and Referrals: 


Patient,NotPresent [Unknown] - As per Instructions

## 2019-01-02 NOTE — ASMTDCNOTE
Case Management Discharge

 

Discharge Order Complete?     Answers:  Yes                                   

Patient to Obtain             Answers:  Other                         Notes:  [facility]

Medications                                                                   

Transportation Arranged       Answers:  Other                         Notes:  [facility] w/c w/ o2


Transport will Pick (Date     01/02/2019 04:30 PM

& Time)                       

[facility] Complete               Answers:  No                                    

Case Management Transport     Answers:  No                                    

Form Complete                                                                 

Faxed Final Orders            Answers:  Yes                                   

Agency/Facility Transfer      Answers:  Yes                                   

Report Printed & Faxed to                                                     

Receiving Agency                                                              

Family Notified               Answers:  No                                    

Discharge Comments            

Notes:

Pts case discussed in tx rounds. Pt is being d/c'd to [facility] today. DC orders sent. SUKHJINDER Garcia 


will call to give report. Non triggering pasrr completed. CM available for changes.







Plan: [facility]

 

Date Signed:  01/02/2019 01:51 PM

Electronically Signed By:MAGUI Malone

## 2019-01-02 NOTE — PDDCSUM
Discharge Summary


Discharge Summary: 


Date of Admission: 12/30/2018


 Date of Discharge: 1/1/2019





Consults: Cardiology, Orthopaedics





Procedures: CT RLE, CTA, TTE





Followup: Cardiology, Orthopaedics, PCP





Hospital Course Problem List:





A Fib w RVR


- Admitted with -140's


- S/p 5 mg IV Metoprolol in ED, started on Metoprolol 12.5 mg BID 


- HR remains elevated overnight especially with activity


- Cardiology consulted on 12/30, started Diltiazem gtt with rate controlled, 

transitioned to PO diltiazem 240 mg qd 


- CHADsVASC >2 given patient's age however given recent fall with hematoma 

would hold off on AC, will continue  ASA 81 mg for now 


- Plan per cardiology to reevaluate chronic AC as an outpatient 





R Hip Fx


- R Hip XR negative for fracture on admission


- RLE CT performed on 12/31 shows nondisplaced fractures


- Ortho consulted recommended nonoperative management, WBAT


- PT/OT 


- PRN Pain medications 





Acute Hypoxic Respiratory Failure


- Requiring 5L of 02 to maintain 02 sat >88%, improved to 2L this AM


- No hx of 02 use, lung disease


- CXR performed, possible focal infiltrate however no cough, fever, leukocytosis

, will hold off on abx, also shows blunting of costoprhenic angles, likely 

component of fluid overload


- TTE performed on 12/30 shows EF 40-45% with mild global hypokinesis, septal 

hypokinesis


- Cardiology recommending holding lasix, ischemic w/u, but given negative trop 

and CP free, will defer to outpatient 


- Continue to wean 02 as tolerated





Hepatic Steatosis 


- Suspect underlying liver involvement related to under-reported consumption of 

alcohol


- ETOH level 291 on admission


- Also with hyperbilirubinemia and elevated INR


- RUQ U/S performed shows diffuse hepatic steatosis


- Continue to monitor LFTs





Anemia


- Hgb 11.3 on admission, 9.5 this AM


- Appears hx of ACD from 2015


- Will continue to monitor, transfuse H/H <7/20





FEN: Regular, IVF PRN





Code: DNR


Time spent on discharge was >35 minutes with >50% of time spent on patient 

education and counseling.

## 2019-01-02 NOTE — ASDISCHSUM
----------------------------------------------

Discharge Information

----------------------------------------------

Plan Status:SNF                                      Medically Cleared to Leave:01/01/2019

Discharge Date:01/01/2019                            CM D/C Disposition:

ADT D/C Disposition:                                 Projected Discharge Date:01/02/2019 11:00 AM

Transportation at D/C:                               Discharge Delay Reason:

Follow-Up Date:01/02/2019 11:00 AM                   Discharge Slot:

Final Diagnosis:

----------------------------------------------

Placement Information

----------------------------------------------

Referral Type:*Nursing Home/SNF                      Referral ID:[id number]

Provider Name:[facility]

Address 1:[address]                             Phone [phone]

Address 2:                                           Fax [phone]

City:[place]                                         Selection Factors:

State:[place]

 

----------------------------------------------

Patient Contact Information

----------------------------------------------

Contact Name:YASMIN                             Relationship:Daughter

Address:                                             Home [phone]

                                                     Work [phone]

City:[place]                                         Alternate Phone:

State/Zip Code:[place] [address]                              Email:

----------------------------------------------

Financial Information

----------------------------------------------

Financial Class:Medicare

Primary Plan Desc:MEDICARE INPATIENT                 Primary Plan Number:[id number]

Secondary Plan Desc:BLUE CROSS FEDERAL PLAN          Secondary Plan Number:[id number]

 

 

----------------------------------------------

Assessment Information

----------------------------------------------

----------------------------------------------

[facility] CM Progress Note

----------------------------------------------

CM Note

 

CM Note                       

Notes:

Pt is a 79 yo F [place] Pt presents with a-fib 

and hip hematoma.  Pt has a history of falls and alcohol use. Pt and daughter are interested in 

getting a life alert. Pt needs  ETOH education prior to discharge. 





Plan: TBD 

 

Date Signed:  12/30/2018 03:12 PM

Electronically Signed By:MAGUI Morgan

 

 

----------------------------------------------

LACE

----------------------------------------------

LACE

 

Length of stay for            Answers:  4-6 days                              

current admission                                                             

Acuity / Level of             Answers:  Yes                                   

Care: Did the patient                                                         

have an inpatient                                                             

admission?                                                                    

Comorbidities - select        Answers:  Opioid dependence                     

all that apply                          / Chronic pain                        

# of Emergency department     Answers:  1-2                                   

visits in the last 6                                                          

months                                                                        

Score: 12

 

Date Signed:  01/02/2019 01:48 PM

Electronically Signed By:MAGUI Malone

 

 

----------------------------------------------

[facility] CM Progress Note

----------------------------------------------

CM Note

 

CM Note                       

Notes:

Met with patient and daughter Darlyn to discuss rehab. Patient is amenable although anxious. I have 

sent referrals to [facility] and [facility], and Darlyn will visit today and let us know which one 

they choose. I also answered questions about what patient can expect upon discharge from SNF (ie 

homecare, private duty, etc....). Case Management will follow.



Current CM Discharge plan: SNF, location TBD

 

Date Signed:  01/01/2019 01:46 PM

Electronically Signed By:Leslie Saul RN

 

 

----------------------------------------------

Case Management Discharge Plan Note

----------------------------------------------

Case Management Discharge

 

Discharge Order Complete?     Answers:  Yes                                   

Patient to Obtain             Answers:  Other                         Notes:  [facility]

Medications                                                                   

Transportation Arranged       Answers:  Other                         Notes:  [facility] w/c w/ o2


Transport will Pick (Date     01/02/2019 04:30 PM

& Time)                       

RAFAT Complete               Answers:  No                                    

Case Management Transport     Answers:  No                                    

Form Complete                                                                 

Faxed Final Orders            Answers:  Yes                                   

Agency/Facility Transfer      Answers:  Yes                                   

Report Printed & Faxed to                                                     

Receiving Agency                                                              

Family Notified               Answers:  No                                    

Discharge Comments            

Notes:

Pts case discussed in tx rounds. Pt is being d/c'd to [facility] today. DC orders sent. SUKHJINDER Garcia 


will call to give report. Non triggering pasrr completed. CM available for changes.







Plan: [facility]

 

Date Signed:  01/02/2019 01:51 PM

Electronically Signed By:MAGUI Malone

 

 

----------------------------------------------

Intervention Information

----------------------------------------------

Intervention Type:*IM-Signed                         Date of Service:01/02/2019 01:51 PM

Patient Type:Inpatient                               Staff Member:Melissa Voss

Hours:                                               Discipline:

Severity:                                            Comment:

## 2019-01-02 NOTE — PDCARPN
Cardiology Progress Note


Chief Complaint: 





Hip pain


Assessment/Plan: 


Assessment:





The patient is a 79 y/o F who was admitted with mechanical fall in the setting 

of ETOH use.  She was found to be in a.fib with RVR on admission.  She was 

started on a Dilt drip with a improvement in her rates.  A echo showed global 

hypokinesis which was more prominent within the septum with a EF of 40-45%.  

Her initial trop was negative.  CXR shows chronic CHF.  She was not started on 

anticoagulation secondary to a hematoma.  She was diuresed on Monday and her 

rates are better controlled.  She states she feels 100% better today.  











Plan:





1. Atrial fibrillation- She was started on PO dilt and low dose Metoprolol was 

added yesterday.  Her rates are adequately controlled ranging from 70-115BPM.   

She has a CHADS VASc of 4 but has a active hematoma.  Full dose anticoagulation 

is on hold but she will continue Aspirin.





2. new CMP with EF of 40-45%.  Tachycardic induced (most likely) versus ETOH 

induced (least likely) versus ischemic.  She is CP free and her initial trop 

was negative.  She will need a ischemic work-up at some point but this can be 

done as a outpatient which she prefers.





3. Systolic CHF- Improved with Lasix x1.





4. Mechanical fall with leg hematoma- CT showed stable superior inferior rami 

fx.  Plan to treat conservatively.





5. Anemia-secondary to #4.





6. hepatic impairment- work-up pending. 














01/02/19 09:35





Subjective: 





She denies any CP or SOB.  She is having hip/leg pain which is improved from 

yesterday and well managed with her current drug regimen.


Objective: 





 Vital Signs (8 Hrs)











  Temp Pulse Resp BP Pulse Ox


 


 01/02/19 07:29  36.4 C  99  16  131/81 H  94


 


 01/02/19 03:53   93  20  125/82 H  95








 Intake/Output (24 Hrs)











 01/01/19 01/02/19 01/03/19





 05:59 05:59 05:59


 


Intake Total 960.5 690 200


 


Output Total 1600 900 100


 


Balance -639.5 -210 100


 


Intake:   


 


  Oral (ml) 440 690 200


 


  IV Infused (ml) 520.5  


 


    Diltiazem HCl/D5w 125 ml 47.5  





    @ Titrate IV CONT SHAUN Rx#   





    :[id number]   


 


    Ns 1,000 ml @ 100 mls/hr 473  





    IV CONT SHAUN Rx#:   





    [id number]   


 


Output:   


 


  Urine (ml) 1600 900 100


 


    Bedside Commode 1000 500 100


 


    Catheter 600 400 


 


Other:   


 


  Weight 85.5 kg 87.685 kg 


 


  Number of Voids   


 


    Bedside Commode   1


 


  Number of Stools   


 


    Bedside Commode 1  











Result Diagrams: 


 01/02/19 03:36





 01/02/19 03:36


Telemetry: 





a.fib with rates of 70-115BPM





- Physical Exam


Constitutional: no apparent distress


Cardiovascular: no murmurs, no rubs, no gallops, irregularly irregular


Peripheral Pulses: 2+: dorsalis-pedis (R), dorsalis-pedis (L)


Respiratory: inspiratory crackles (left lung base. Improved)


Skin: no edema


Neurologic: AAOx3





ICD10 Worksheet


Patient Problems: 


 Problems











Problem Status Onset


 


Atrial fibrillation Acute  


 


Hematoma of hip Acute

## 2019-01-24 ENCOUNTER — HOSPITAL ENCOUNTER (EMERGENCY)
Dept: HOSPITAL 80 - FED | Age: 79
LOS: 1 days | Discharge: HOME | End: 2019-01-25
Payer: COMMERCIAL

## 2019-01-24 DIAGNOSIS — I44.4: ICD-10-CM

## 2019-01-24 DIAGNOSIS — R07.81: Primary | ICD-10-CM

## 2019-01-24 DIAGNOSIS — Z79.82: ICD-10-CM

## 2019-01-24 DIAGNOSIS — I48.91: ICD-10-CM

## 2019-01-24 DIAGNOSIS — S32.9XXD: ICD-10-CM
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
## 2019-01-24 DIAGNOSIS — M85.80: ICD-10-CM

## 2019-01-24 DIAGNOSIS — Z79.899: ICD-10-CM

## 2019-01-24 DIAGNOSIS — J90: ICD-10-CM

## 2019-01-24 DIAGNOSIS — I51.7: ICD-10-CM

## 2019-01-24 PROCEDURE — 71275 CT ANGIOGRAPHY CHEST: CPT

## 2019-01-24 PROCEDURE — 99285 EMERGENCY DEPT VISIT HI MDM: CPT

## 2019-01-24 PROCEDURE — 96375 TX/PRO/DX INJ NEW DRUG ADDON: CPT

## 2019-01-24 PROCEDURE — 93005 ELECTROCARDIOGRAM TRACING: CPT

## 2019-01-24 PROCEDURE — 96374 THER/PROPH/DIAG INJ IV PUSH: CPT

## 2019-01-25 VITALS — SYSTOLIC BLOOD PRESSURE: 118 MMHG | DIASTOLIC BLOOD PRESSURE: 69 MMHG

## 2019-01-25 LAB — PLATELET # BLD: 301 10^3/UL (ref 150–400)

## 2019-01-25 NOTE — CPEKG
Test Reason : OPEN

Blood Pressure : ***/*** mmHG

Vent. Rate : 122 BPM     Atrial Rate : 091 BPM

   P-R Int : 164 ms          QRS Dur : 091 ms

    QT Int : 329 ms       P-R-T Axes : 000 -52 051 degrees

   QTc Int : 469 ms

 

Atrial fibrillation

Paired ventricular premature complexes

Left anterior fascicular block

 

Confirmed by Tate Muir ([id number]) on 1/25/2019 5:24:36 AM

 

Referred By: Tate Muir           Confirmed By:Tate Muir

## 2019-01-25 NOTE — EDPHY
H & P


Stated Complaint: SHARP PAIN UNDERR R BREATH, WORSE WITH DEEP BREATH


Time Seen by Provider: 01/25/19 00:04


HPI/ROS: 





Chief Complaint:  Right lower chest pain





HPI:  78-year-old who is currently recovering from pelvic fractures after fall 

on the 30th of last month.  She is also diagnosed with atrial fibrillation at 

that time.  Currently taking aspirin daily.  Patient was at home had a sudden 

onset of sharp pain in her right lower chest/upper abdomen.  Is worse to take a 

deep breath.  She took some oxycodone with no change.  Denies any falls or any 

other injury.  No nausea or vomiting.  Does not have a history of similar pain 

in the past.  She has been getting around her residence with a walker but is 

not as active as normal.  No other past medical history.  No fevers or chills.  

No cough.  Perhaps some mild shortness of breath.





ROS:  10 systems were reviewed and were negative except those elements noted in 

the HPI.





PMH:  Pelvic fracture, atrial fibrillation





Social History: No smoking, no alcohol,  no recreational drug use





Family History: non-contributory





Physical Exam:


Gen: Awake, Alert, No Distress


HEENT:  


     Nose: no rhinorrhea


     Eyes: PERRLA, EOMI


     Mouth: Moist mucosa 


Neck: Supple, no JVD


Chest:  Patient has tenderness in the right lower anterior chest wall, lungs 

clear to auscultation


Heart: S1, S2 normal, no murmur


Abd: Soft, non-tender, no guarding, right upper quadrant is completely soft and 

benign.


Back: no CVA tenderness, no midline tenderness 


Ext: no edema, non-tender


Skin: no rash


Neuro: CN II-XII intact, Sensation grossly intact, Strength 5/5 in bilateral 

upper and lower extremities








- Personal History


Current Tetanus/Diphtheria Vaccine: Yes


Current Tetanus Diphtheria and Acellular Pertussis (TDAP): Yes





- Medical/Surgical History


Hx Asthma: No


Hx Chronic Respiratory Disease: No


Hx Diabetes: No


Hx Cardiac Disease: Yes


Hx Renal Disease: No


Hx Cirrhosis: No


Hx Alcoholism: No


Hx HIV/AIDS: No


Hx Splenectomy or Spleen Trauma: No


Other PMH: Appendectomy 12 yrs age, RUE fx 2x, LUE fx 2/2 fall.  PELVIC FX,  A-

FIB





- Social History


Smoking Status: Never smoked


Constitutional: 


 Initial Vital Signs











Temperature (C)  37.0 C   01/24/19 23:57


 


Heart Rate  107 H  01/24/19 23:57


 


Respiratory Rate  18   01/24/19 23:57


 


Blood Pressure  120/85 H  01/24/19 23:57


 


O2 Sat (%)  89 L  01/24/19 23:57








 











O2 Delivery Mode               Nasal Cannula


 


O2 (L/minute)                  2














Allergies/Adverse Reactions: 


 





erythromycin base [Erythromycin Base] Allergy (Intermediate, Verified 01/25/19 

00:02)


 SCALP ITCHING AND FELT LIKE LITTLE BUMPS WERE ALL OVER IT








Home Medications: 














 Medication  Instructions  Recorded


 


Acetaminophen [Tylenol 325mg (*)] 650 mg PO Q4HRS PRN  tab 01/02/19


 


Aspirin EC [Aspirin EC 81 mg (*)] 81 mg PO DAILY  tab 01/02/19


 


Diltiazem Cd [Cardizem   mg PO DAILY #0 cap 01/02/19





(*)]  


 


Polyethylene Glycol 3350 [Miralax 17 gm PO DAILY PRN  pkt 01/02/19





17 gm (*)]  


 


Sennosides/Docusate Sodium 1 - 2 tab PO BID  tab 01/02/19





[Senokot-S]  


 


oxyCODONE IR [Oxycodone Ir (*)] 2.5 - 5 mg PO Q6HRS PRN  tab 01/02/19


 


Diltiazem [Cardizem] 240 mg PO DAILY 01/25/19


 


Gabapentin [Neurontin 100 MG (*)] 100 mg PO HS 01/25/19


 


Meloxicam 7.5 mg PO DAILY 01/25/19














Medical Decision Making





- Diagnostics


Imaging Results: 


CT angiogram of the chest shows no pulmonary embolism, small pleural effusion 

on the right, osteopenia, cardiomegaly.  Study interpreted by Dr. Weiss, 

direct Radiology.


ED Course/Re-evaluation: 





Patient presenting with right-sided sharp pleuritic pain, 3 weeks status post 

bilateral pelvic fractures.  CT angiogram of the chest shows no PE or other 

acute abnormality.  She does have small right pleural effusion.  Patient is 

atrial fibrillation with RVR.  She takes her diltiazem in the morning.  Will 

give her anti-inflammatory and a small dose metoprolol here.  Her abdomen is 

soft completely benign.  She has no Leonard sign or right upper quadrant 

tenderness.  Laboratory evaluations are otherwise unremarkable.





Heart rate pain or improved.  Patient ambulating unassisted.  Will discharge 

with follow-up with primary care physician.





- Data Points


Laboratory Results: 


 Laboratory Results





 01/25/19 00:20 





 01/25/19 00:20 





 











  01/25/19 01/25/19





  00:20 00:20


 


WBC    10.05 10^3/uL H 10^3/uL





    (3.80-9.50) 


 


RBC    3.28 10^6/uL L 10^6/uL





    (4.18-5.33) 


 


Hgb    13.1 g/dL g/dL





    (12.6-16.3) 


 


Hct    38.4 % %





    (38.0-47.0) 


 


MCV    117.1 fL H fL





    (81.5-99.8) 


 


MCH    39.9 pg H pg





    (27.9-34.1) 


 


MCHC    34.1 g/dL g/dL





    (32.4-36.7) 


 


RDW    17.1 % H %





    (11.5-15.2) 


 


Plt Count    301 10^3/uL 10^3/uL





    (150-400) 


 


MPV    10.6 fL fL





    (8.7-11.7) 


 


Neut % (Auto)    74.8 % H %





    (39.3-74.2) 


 


Lymph % (Auto)    11.2 % L %





    (15.0-45.0) 


 


Mono % (Auto)    11.4 % %





    (4.5-13.0) 


 


Eos % (Auto)    1.4 % %





    (0.6-7.6) 


 


Baso % (Auto)    0.9 % %





    (0.3-1.7) 


 


Nucleat RBC Rel Count    0.0 % %





    (0.0-0.2) 


 


Absolute Neuts (auto)    7.51 10^3/uL H 10^3/uL





    (1.70-6.50) 


 


Absolute Lymphs (auto)    1.13 10^3/uL 10^3/uL





    (1.00-3.00) 


 


Absolute Monos (auto)    1.15 10^3/uL H 10^3/uL





    (0.30-0.80) 


 


Absolute Eos (auto)    0.14 10^3/uL 10^3/uL





    (0.03-0.40) 


 


Absolute Basos (auto)    0.09 10^3/uL 10^3/uL





    (0.02-0.10) 


 


Absolute Nucleated RBC    0.00 10^3/uL 10^3/uL





    (0-0.01) 


 


Immature Gran %    0.3 % %





    (0.0-1.1) 


 


Immature Gran #    0.03 10^3/uL 10^3/uL





    (0.00-0.10) 


 


Platelet Estimate    ADEQUATE 





    (ADEQ) 


 


Polychromasia    1+  H 





   


 


Oval Macrocytes    3+  H 





   


 


Smear Review By    Pending 





   


 


Sodium  137 mEq/L mEq/L  





   (135-145)  


 


Potassium  4.5 mEq/L mEq/L  





   (3.5-5.2)  


 


Chloride  108 mEq/L mEq/L  





   ()  


 


Carbon Dioxide  19 mEq/l L mEq/l  





   (22-31)  


 


Anion Gap  10 mEq/L mEq/L  





   (6-14)  


 


BUN  12 mg/dL mg/dL  





   (7-23)  


 


Creatinine  1.0 mg/dL mg/dL  





   (0.6-1.0)  


 


Estimated GFR  53   





   


 


Glucose  121 mg/dL H mg/dL  





   ()  


 


Calcium  9.0 mg/dL mg/dL  





   (8.5-10.4)  


 


Total Bilirubin  1.0 mg/dL mg/dL  





   (0.1-1.4)  


 


AST  45 IU/L IU/L  





   (14-46)  


 


ALT  29 IU/L IU/L  





   (9-52)  


 


Alkaline Phosphatase  240 IU/L H IU/L  





   ()  


 


Total Protein  7.6 g/dL g/dL  





   (6.3-8.2)  


 


Albumin  3.7 g/dL g/dL  





   (3.5-5.0)  











Medications Given: 


 








Discontinued Medications





Ketorolac Tromethamine (Toradol)  15 mg IVP EDNOW ONE


   Stop: 01/25/19 01:42


   Last Admin: 01/25/19 02:14 Dose:  15 mg


Metoprolol Tartrate (Lopressor Injection)  5 mg IVP EDNOW ONE


   Stop: 01/25/19 01:42


   Last Admin: 01/25/19 02:17 Dose:  5 mg








Departure





- Departure


Disposition: Home, Routine, Self-Care


Clinical Impression: 


 Atrial fibrillation, Pleurisy





Condition: Good


Instructions:  A-fib (Atrial Fibrillation) (ED), Pleurisy (ED)


Additional Instructions: 


Follow up with your cardiologist and primary care physician as scheduled.


Return emergency depart for increasing pain, shortness of breath, fevers, cough

, or any other concerns.


Referrals: 


Nora Colin MD [Primary Care Provider] - As per Instructions

## 2019-03-01 ENCOUNTER — HOSPITAL ENCOUNTER (OUTPATIENT)
Dept: HOSPITAL 80 - FCATH | Age: 79
Discharge: HOME | End: 2019-03-01
Attending: INTERNAL MEDICINE
Payer: COMMERCIAL

## 2019-03-01 DIAGNOSIS — I48.91: Primary | ICD-10-CM

## 2019-03-01 DIAGNOSIS — I10: ICD-10-CM

## 2019-03-01 DIAGNOSIS — Z79.01: ICD-10-CM

## 2019-03-01 LAB
INR PPP: 1.53 (ref 0.83–1.16)
PROTHROMBIN TIME: 18.5 SEC (ref 12–15)

## 2019-03-01 PROCEDURE — 5A2204Z RESTORATION OF CARDIAC RHYTHM, SINGLE: ICD-10-PCS | Performed by: INTERNAL MEDICINE

## 2019-03-01 NOTE — PDANEPAE
ANE History of Present Illness





78 year old female in A-fib for cardioversion.





ANE Past Medical History





- Cardiovascular History


Hx Hypertension: Yes


Hx Arrhythmias: Yes


Hx Chest Pain: No


Hx Coronary Artery / Peripheral Vascular Disease: No


Hx CHF / Valvular Disease: No


Hx Palpitations: No





- Pulmonary History


Hx COPD: No


Hx Asthma/Reactive Airway Disease: No


Hx Recent Upper Respiratory Infection: No


Hx Oxygen in Use at Home: No


Hx Sleep Apnea: No





- Endocrine History


Hx Diabetes: No





- Chronic Pain History


Chronic Pain: No





ANE Review of Systems


Review of systems is: negative


Review of Systems: 








ANE Patient History





- Allergies


Allergies/Adverse Reactions: 








erythromycin base [Erythromycin Base] Allergy (Intermediate, Verified 01/25/19 

00:02)


 SCALP ITCHING AND FELT LIKE LITTLE BUMPS WERE ALL OVER IT








- Home Medications


Home Medications: 








Eliquis 5 mg PO BID 03/01/19 [Last Taken 03/01/19 06:00]








- Smoking Hx


Smoking Status: Never smoked





GRICELDA Labs/Vital Signs





- Labs


Result Diagrams: 


 03/01/19 07:41





- Vital Signs


Height: 168 cm


Weight: 86.2 kg





ANE Physical Exam





- Airway


Neck exam: FROM


Mallampati Score: Class 2


Mouth exam: normal dental/mouth exam





- Pulmonary


Pulmonary: no respiratory distress





- Cardiovascular


Cardiovascular: irregularly irregular





- ASA Status


ASA Status: III





ANE Anesthesia Plan


Anesthesia Plan: MAC

## 2019-03-01 NOTE — ECHO
[email or web address]





[address] [address] 

Main: [phone] 



Fax: 



Transthoracic Echocardiogram 

Name:            MAHAD BARR                    MR#:

[id number]

Study Date:      03/01/2019                          Study Time:

10:07 AM

YOB: 1940                          Age:           78

year(s)

Height:          167.6 cm (66 in.)                   Weight:

86.18 kg (190 lb.)

BSA:             1.96 m2                             Gender:

Female

Examination:     Limited Echo                        Indication:    a

fib

Image Quality:                                       Contrast: 

Requested by:    Roman Trejo                         BP:

116 mmHg/72 mmHg

Heart Rate:                                          Rhythm: 

Indication:      a fib 



Procedure Staff 

Ultrasound Technician:   Cecille Jain [facility] 

Reading Physician:       Roman Trejo MD 

Requesting Provider: 



Conclusions:          Normal size left ventricle.  

Low normal left ventricular systolic function.  

The ejection fraction is estimated to be 50-55 %.  

No regional WMA 

When compared to the 12/30/18 study. The LVEF has improved from

40/45%.



Measurements: 

Chambers                   Valvular Assessment AV/MV          Valvular

Assessment TV/PV



Normal                                Normal

Normal

Name         Value   Range             Name        Value Range

Name          Value Range

EF Range:    50-55 %

TR Vmax:      3.16 mm/s ( - )



TR PGmax:     40 mmHg ( - )  



Continued Measurements: 



Findings:             Left Ventricle: 

Normal size left ventricle. Low normal left ventricular systolic

function. The ejection fraction is

estimated to be 50-55 %. 







Electronically signed by Roman Trejo MD on 03/01/2019 at 10:51 AM 

(No Signature Object) 



Patient: MAHAD BARR                  MRN: [id number]

Study Date: 03/01/2019   Page 1 of 1

10:07 AM 







[id number]

## 2019-03-06 NOTE — CPEKG
Test Reason : OPEN

Blood Pressure : ***/*** mmHG

Vent. Rate : 137 BPM     Atrial Rate : 130 BPM

   P-R Int : 099 ms          QRS Dur : 081 ms

    QT Int : 316 ms       P-R-T Axes : 000 -55 014 degrees

   QTc Int : 477 ms

 

Atrial fibrillation with rapid V-rate

Left anterior fascicular block

Repolarization abnormality, prob rate related

 

Confirmed by Roman Stinson ([id number]) on 3/6/2019 11:21:01 AM

 

Referred By: Roman Stinson           Confirmed By:Roman Stinson

## 2019-03-06 NOTE — CPEKG
Test Reason : OPEN

Blood Pressure : ***/*** mmHG

Vent. Rate : 074 BPM     Atrial Rate : 075 BPM

   P-R Int : 188 ms          QRS Dur : 105 ms

    QT Int : 443 ms       P-R-T Axes : 033 -48 -32 degrees

   QTc Int : 492 ms

 

Sinus rhythm

Left anterior fascicular block

Abnormal T, consider ischemia, diffuse leads

 

Confirmed by Roman Stinson ([id number]) on 3/6/2019 11:24:07 AM

 

Referred By: Roman Stinson           Confirmed By:Roman Stinson